# Patient Record
Sex: FEMALE | URBAN - METROPOLITAN AREA
[De-identification: names, ages, dates, MRNs, and addresses within clinical notes are randomized per-mention and may not be internally consistent; named-entity substitution may affect disease eponyms.]

---

## 2017-04-20 ENCOUNTER — IMPORTED ENCOUNTER (OUTPATIENT)
Dept: URBAN - METROPOLITAN AREA CLINIC 9 | Facility: CLINIC | Age: 77
End: 2017-04-20

## 2018-06-05 ENCOUNTER — IMPORTED ENCOUNTER (OUTPATIENT)
Dept: URBAN - METROPOLITAN AREA CLINIC 9 | Facility: CLINIC | Age: 78
End: 2018-06-05

## 2018-06-11 ENCOUNTER — IMPORTED ENCOUNTER (OUTPATIENT)
Dept: URBAN - METROPOLITAN AREA CLINIC 9 | Facility: CLINIC | Age: 78
End: 2018-06-11

## 2018-07-09 ENCOUNTER — IMPORTED ENCOUNTER (OUTPATIENT)
Dept: URBAN - METROPOLITAN AREA CLINIC 9 | Facility: CLINIC | Age: 78
End: 2018-07-09

## 2018-11-15 ENCOUNTER — IMPORTED ENCOUNTER (OUTPATIENT)
Dept: URBAN - METROPOLITAN AREA CLINIC 9 | Facility: CLINIC | Age: 78
End: 2018-11-15

## 2019-06-14 ENCOUNTER — IMPORTED ENCOUNTER (OUTPATIENT)
Dept: URBAN - METROPOLITAN AREA CLINIC 9 | Facility: CLINIC | Age: 79
End: 2019-06-14

## 2019-11-12 ENCOUNTER — IMPORTED ENCOUNTER (OUTPATIENT)
Dept: URBAN - METROPOLITAN AREA CLINIC 9 | Facility: CLINIC | Age: 79
End: 2019-11-12

## 2019-12-03 NOTE — PROCEDURE NOTE: SURGICAL
<p>Prior to commencing surgery patient identification, surgical procedure, site, and side were confirmed by Dr. Kalani Garcia. Following topical proparacaine anesthesia, the patient was positioned at the YAG laser, a contact lens coupled to the cornea with methylcellulose and an axial posterior capsulotomy performed without complication using 3.3 Mj x 41. Excess methylcellulose was washed from the eye, one drop of Alphagan was instilled and the patient returned to the holding area having tolerated the procedure well and without complication. </p><p>MRN:[id number]</p>

## 2020-03-10 ENCOUNTER — IMPORTED ENCOUNTER (OUTPATIENT)
Dept: URBAN - METROPOLITAN AREA CLINIC 9 | Facility: CLINIC | Age: 80
End: 2020-03-10

## 2020-08-11 ENCOUNTER — IMPORTED ENCOUNTER (OUTPATIENT)
Dept: URBAN - METROPOLITAN AREA CLINIC 9 | Facility: CLINIC | Age: 80
End: 2020-08-11

## 2020-12-15 ENCOUNTER — IMPORTED ENCOUNTER (OUTPATIENT)
Dept: URBAN - METROPOLITAN AREA CLINIC 9 | Facility: CLINIC | Age: 80
End: 2020-12-15

## 2021-02-24 ENCOUNTER — IMPORTED ENCOUNTER (OUTPATIENT)
Dept: URBAN - METROPOLITAN AREA CLINIC 9 | Facility: CLINIC | Age: 81
End: 2021-02-24

## 2021-03-02 ENCOUNTER — IMPORTED ENCOUNTER (OUTPATIENT)
Dept: URBAN - METROPOLITAN AREA CLINIC 9 | Facility: CLINIC | Age: 81
End: 2021-03-02

## 2021-07-27 NOTE — PROCEDURE NOTE: SURGICAL
<p>Prior to commencing surgery patient identification, surgical procedure, site, and side were confirmed by Dr. Dione Meyer. Following topical proparacaine anesthesia, the patient was positioned at the YAG laser, a contact lens coupled to the cornea with methylcellulose and an axial posterior capsulotomy performed without complication using 3.2 Mj x 33. Excess methylcellulose was washed from the eye, one drop of Alphagan was instilled and the patient returned to the holding area having tolerated the procedure well and without complication. </p><p> [id number]</p>

## 2021-08-03 NOTE — PROCEDURE NOTE: CLINICAL
PROCEDURE NOTE: Excision of Eyelid Lesion Right Upper Lid. Diagnosis: Eyelid Lesion, Uncertain Behavior. Anesthesia: Topical. Prep: Betadine Scrub. Risks, benefits and alternatives discussed. Patient desires to proceed with excision of growth/lesion today. A drop of proparacaine was instilled in the involved eye. A tetracaine drop was used on a cotton tipped applicator and placed on the conjunctiva. The involved area was anesthetized using 1% lidocaine with epi. The lesion was excised using mini Westcotts and forceps and placed in formalin to be sent to pathology. The wound was cauterized to achieve hemostasis. Erythromycin ophthalmic ointment was applied. Post op instructions were given to the patient. The patient tolerated the procedure well and left in good condition. The patient understands to call us for any concerns. Talita Castillo

## 2021-08-03 NOTE — PATIENT DISCUSSION
H&P essentially unchanged. Patient Instructions by Quita Gonzalez MD at 01/27/17 09:11 AM     Author:  Quita Gonzalez MD Service:  (none) Author Type:  Physician     Filed:  01/27/17 09:11 AM Encounter Date:  1/27/2017 Status:  Signed     :  Quita Gonzalez MD (Physician)            Have labs done today    Schedule mammogram by 2/16/17    Limit salt  Regular exercise    Do monthly breast exam  PATIENT INFORMATION   -- Please go to the lab now to have your labwork completed. Your doctor’s office will notify you of your results within 10 working days.  -- Please call [phone] to schedule your Mammogram  Mammogram Instructions:  The day of the exam you may bathe as usual.  DO NOT use powders, creams or deodorants on the breast or underarm area.    To reduce tenderness in the breasts, it is suggested that you refrain from caffeine for one week prior to your mammogram.    Please come to the [facility] at or before your appointment time so that we may start the exam as close to your appointment time as possible.  If you need to cancel, please call [phone] as soon as possible, and we will be happy to reschedule it for you.  Due to our busy schedule, tardiness may result in having to reschedule your exam.    If your previous mammogram was not performed at [facility], you must obtain the films and bring them with you to your mammogram appointment or have them mailed to:  [facility]  [facility] [place]. [address]    Due to safety concerns, another adult must accompany children that need supervision.    Follow-Up  -- Make an appointment with Quita Gonzalez MD in six months     Additional Educational Resources:  For additional resources regarding your symptoms, diagnosis, or further health information, please visit the Health Resources section on [email or web address] or the Online Health Resources section in [facility].            Revision History        User Key  Date/Time User Provider Type Action    > [N/A] 01/27/17 09:11 AM Quita Gonzalez MD Physician Sign

## 2021-10-15 ASSESSMENT — TONOMETRY
OS_IOP_MMHG: 20
OS_IOP_MMHG: 17
OS_IOP_MMHG: 17
OD_IOP_MMHG: 16
OD_IOP_MMHG: 19
OS_IOP_MMHG: 19
OD_IOP_MMHG: 28
OD_IOP_MMHG: 18
OS_IOP_MMHG: 20
OS_IOP_MMHG: 17
OS_IOP_MMHG: 22
OS_IOP_MMHG: 28
OD_IOP_MMHG: 20
OD_IOP_MMHG: 16
OD_IOP_MMHG: 20
OD_IOP_MMHG: 17
OD_IOP_MMHG: 20
OS_IOP_MMHG: 20
OS_IOP_MMHG: 18
OD_IOP_MMHG: 23
OS_IOP_MMHG: 18
OD_IOP_MMHG: 16

## 2021-10-15 ASSESSMENT — VISUAL ACUITY
OD_CC: 20/25 - SN
OD_CC: 20/25 -2 SN
OD_CC: 20/25 SN
OS_CC: 20/30 +2 SN
OD_CC: 20/30 SN
OS_CC: 20/30 SN
OD_CC: 20/30 -2 SN
OS_CC: 20/30 +2 SN
OD_CC: 20/30 -2 SN
OD_CC: 20/25 SN
OD_CC: 20/25 SN
OS_CC: 20/30 -2 SN
OD_CC: 20/25 -2 SN
OS_CC: 20/25 + SN
OS_CC: 20/30 +2 SN
OD_CC: 20/20 SN
OS_CC: 20/50 SN
OS_CC: 20/30 -2 SN
OS_CC: 20/30 -2 SN
OD_CC: 20/40 -2 SN
OD_CC: 20/30 -2 SN
OS_CC: 20/30 - SN
OS_CC: 20/30 SN
OS_CC: 20/30 -2 SN
OD_CC: 20/25 SN
OS_CC: 20/30 SN
OS_CC: 20/40 + SN
OD_CC: 20/25 SN
OS_CC: 20/40 -2 SN
OD_CC: 20/30 SN

## 2021-10-15 ASSESSMENT — PACHYMETRY
OS_CT_UM: 568.0
OD_CT_UM: 570.0